# Patient Record
Sex: MALE | Race: BLACK OR AFRICAN AMERICAN | NOT HISPANIC OR LATINO | Employment: OTHER | URBAN - METROPOLITAN AREA
[De-identification: names, ages, dates, MRNs, and addresses within clinical notes are randomized per-mention and may not be internally consistent; named-entity substitution may affect disease eponyms.]

---

## 2019-06-13 ENCOUNTER — OFFICE VISIT (OUTPATIENT)
Dept: URGENT CARE | Facility: CLINIC | Age: 64
End: 2019-06-13

## 2019-06-13 VITALS
OXYGEN SATURATION: 96 % | SYSTOLIC BLOOD PRESSURE: 122 MMHG | TEMPERATURE: 97 F | BODY MASS INDEX: 16.55 KG/M2 | RESPIRATION RATE: 18 BRPM | HEIGHT: 66 IN | WEIGHT: 103 LBS | DIASTOLIC BLOOD PRESSURE: 87 MMHG | HEART RATE: 109 BPM

## 2019-06-13 DIAGNOSIS — R79.89 ELEVATED SERUM CREATININE: ICD-10-CM

## 2019-06-13 DIAGNOSIS — E87.1 HYPONATREMIA: ICD-10-CM

## 2019-06-13 DIAGNOSIS — R10.13 MIDEPIGASTRIC PAIN: ICD-10-CM

## 2019-06-13 DIAGNOSIS — E87.5 HYPERKALEMIA: Primary | ICD-10-CM

## 2019-06-13 LAB
GLUCOSE SERPL-MCNC: 89 MG/DL (ref 70–110)
POC ANION GAP: 15 MMOL/L (ref 10–20)
POC BUN: 33 MMOL/L (ref 8–26)
POC CHLORIDE: 99 MMOL/L (ref 98–109)
POC CREATININE: 1.6 MG/DL (ref 0.6–1.3)
POC HEMATOCRIT: 53 %PCV (ref 42–52)
POC HEMOGLOBIN: 18 G/DL (ref 13.5–18)
POC ICA: 1.2 MMOL/L (ref 1.12–1.32)
POC POTASSIUM: 5.5 MMOL/L (ref 3.5–4.9)
POC SODIUM: 134 MMOL/L (ref 138–146)
POC TCO2: 26 MMOL/L (ref 24–29)

## 2019-06-13 PROCEDURE — 99213 PR OFFICE/OUTPT VISIT, EST, LEVL III, 20-29 MIN: ICD-10-PCS | Mod: S$GLB,,, | Performed by: STUDENT IN AN ORGANIZED HEALTH CARE EDUCATION/TRAINING PROGRAM

## 2019-06-13 PROCEDURE — 80047 POCT CHEMISTRY PANEL: ICD-10-PCS | Mod: QW,S$GLB,, | Performed by: STUDENT IN AN ORGANIZED HEALTH CARE EDUCATION/TRAINING PROGRAM

## 2019-06-13 PROCEDURE — 99213 OFFICE O/P EST LOW 20 MIN: CPT | Mod: S$GLB,,, | Performed by: STUDENT IN AN ORGANIZED HEALTH CARE EDUCATION/TRAINING PROGRAM

## 2019-06-13 PROCEDURE — 80047 BASIC METABLC PNL IONIZED CA: CPT | Mod: QW,S$GLB,, | Performed by: STUDENT IN AN ORGANIZED HEALTH CARE EDUCATION/TRAINING PROGRAM

## 2019-06-13 NOTE — PROGRESS NOTES
"Subjective:       Patient ID: Ashok Patrick is a 64 y.o. male.    Vitals:  height is 5' 6" (1.676 m) and weight is 46.7 kg (103 lb). His temperature is 97 °F (36.1 °C). His blood pressure is 122/87 and his pulse is 109. His respiration is 18 and oxygen saturation is 96%.     Chief Complaint: Abdominal Pain    Patient present with c/o stomach pains and cramping x 8 weeks, unable to eat. It eased up a little for a few weeks and now it has returned. No matter what he eats he has gas. Patient use to weight 120 lbs  And now weights 103 lbs. Patient normal BM is 2x's per day and since has increased to 5-7 with diarrhea.     Abdominal Pain   This is a new problem. The current episode started more than 1 month ago. The onset quality is gradual. The problem occurs intermittently. The problem has been waxing and waning. The pain is located in the epigastric region. The pain is at a severity of 4/10. The pain is mild. The quality of the pain is cramping. The abdominal pain radiates to the chest, RUQ and LUQ. Associated symptoms include anorexia, belching, diarrhea, flatus, nausea, vomiting and weight loss. Pertinent negatives include no arthralgias, constipation, dysuria, fever, headaches or hematochezia. The pain is aggravated by eating. The pain is relieved by belching and passing flatus. He has tried acetaminophen for the symptoms. The treatment provided no relief. His past medical history is significant for abdominal surgery. There is no history of gallstones, GERD or pancreatitis.   63yo M with PMHx of hernia (s/p repair 2004) who presents for abd pain. Has had midepigastric pain with decreased appetite x2mo. Sx's have waxed and waned but more recently pt has begun to have dark brown diarrhea (5-6x / day) and has had 2 episodes of non-bloody emesis. Reports midepigastric pain is worse ~10-20min after eating. +smoking. Has had some associated fatigue/weakness with weight loss since symptoms started but attributes to loss of " "appetite. Denied f/c, urinary sx's, hx of stomach ulcers, blood in stools, chest pain, SoB.    Constitution: Positive for appetite change, fatigue, unexpected weight change and generalized weakness. Negative for chills, sweating and fever.   HENT: Negative for ear pain, congestion, sore throat and trouble swallowing.    Neck: Negative for neck pain, neck stiffness and neck swelling.   Cardiovascular: Negative for chest pain, leg swelling and palpitations.   Eyes: Negative for eye pain, vision loss and double vision.   Respiratory: Negative for cough, sputum production and shortness of breath.    Gastrointestinal: Positive for abdominal pain, abdominal bloating, history of abdominal surgery, nausea, vomiting and diarrhea. Negative for abdominal trauma, constipation, bright red blood in stool, dark colored stools, heartburn and bowel incontinence.   Genitourinary: Negative for dysuria, flank pain and pelvic pain.   Musculoskeletal: Negative for joint pain, joint swelling and back pain.   Skin: Negative for color change, rash and lesion.   Allergic/Immunologic: Negative for environmental allergies, seasonal allergies and food allergies.   Neurological: Negative for dizziness, light-headedness, headaches and disorientation.   Psychiatric/Behavioral: Positive for sleep disturbance (2/2 pain). Negative for disorientation, confusion and nervous/anxious. The patient is not nervous/anxious.        Objective:       Vitals:    06/13/19 1013   BP: 122/87   Pulse: 109   Resp: 18   Temp: 97 °F (36.1 °C)   SpO2: 96%   Weight: 46.7 kg (103 lb)   Height: 5' 6" (1.676 m)     Physical Exam   Constitutional: He is oriented to person, place, and time. He appears well-developed and well-nourished. No distress.   HENT:   Head: Normocephalic and atraumatic.   Nose: Nose normal.   Eyes: Conjunctivae and EOM are normal. Right eye exhibits no discharge. Left eye exhibits no discharge.   Neck: Normal range of motion. Neck supple. "   Cardiovascular: Regular rhythm and normal heart sounds. Tachycardia present. Exam reveals no gallop and no friction rub.   No murmur heard.  Pulmonary/Chest: Effort normal and breath sounds normal. No stridor. He has no wheezes. He has no rales.   Abdominal: Bowel sounds are normal. He exhibits no distension. There is tenderness (midepigastric/RUQ tenderness). There is guarding. There is no rebound.   Musculoskeletal: He exhibits no edema or tenderness.   Neurological: He is alert and oriented to person, place, and time. No cranial nerve deficit (grossly intact).   Skin: Skin is warm and dry. No rash noted.   Psychiatric: He has a normal mood and affect. His behavior is normal. Judgment and thought content normal.   Nursing note and vitals reviewed.      Recent Lab Results       06/13/19  1042        POC Sodium 134     POC Potassium 5.5     POC Chloride 99     POC BUN 33     POC Creatinine 1.6     POC iCA 1.20     POC TCO2 26     POC Hematocrit 53     POC Anion Gap 15     POC Hemoglobin 18.0     POC Glucose 89          Assessment:       1. Hyperkalemia    2. Midepigastric pain    3. Elevated serum creatinine    4. Hyponatremia        Plan:         Hyperkalemia    Midepigastric pain  -     POCT Chemistry Panel  -     (pyxis) gi cocktail (mylanta 30 mL, lidocaine 2 % viscous 10 mL, dicyclomine 10 mL) 50 mL    Elevated serum creatinine    Hyponatremia    Pt with no known hx of renal disease presenting with x2mo GI sx's with elevated serum cr/BUN, hyponatremia, and hyperkalemia. Likely 2/2 dehydration from lack of appetite and diarrhea, but unable to rule out more malicious causes. Pt to present to [facility] by POV for further evaluation.    Results and differential reviewed with patient, questions answered, and return precautions given    Follow up today (on 6/13/2019) for with ER for further evaluation.    Oleg John MD/MPH  [facility]  [facility]

## 2019-06-13 NOTE — PATIENT INSTRUCTIONS
Hyperkalemia  Hyperkalemia is too much potassium in the blood. This most often occurs in people who take certain medicines or people with kidney disease.  This condition often has no symptoms until levels of potassium become high. If symptoms do occur, they include muscle weakness and changes in the heartbeat. A blood test is done to diagnose the problem. An ECG (electrocardiogram) may also be done to test the heartbeat.  If hyperkalemia is caused by a medicine, the healthcare provider may lower the dose or switch to a different medicine. A low-potassium diet may also be prescribed.   Home care  · Be sure your healthcare provider knows about all of the medicines you take. Follow your healthcare provider's advice about making changes to your medicines.  · If a low-potassium diet has been prescribed, follow this closely. If you need help, ask to be referred to a dietitian for advice on how to follow this diet.  Follow-up care  Follow up with your healthcare provider. You may need a repeat blood test within the next 7 days. Schedule this as advised.  When to seek medical advice  Call your healthcare provider if any of the following occur:  · Weakness, dizziness, or lightheadedness  · Nausea, vomiting, or diarrhea  · Urinating only in small amounts or not urinating  · Symptoms don't go away or get worse  Call 911  Call 911 or emergency services right away if any of the following occur:  · Fast or irregular heartbeat  · Fainting  · Severe shortness of breath  · Chest, arm, shoulder, neck or upper back pain  · Trouble controlling your muscles  Date Last Reviewed: 6/26/2015  © 5220-7144 [employer]. [address] [place] [address], [place] [address]. All rights reserved. This information is not intended as a substitute for professional medical care. Always follow your healthcare professional's instructions.